# Patient Record
Sex: MALE | Race: WHITE | Employment: FULL TIME | URBAN - NONMETROPOLITAN AREA
[De-identification: names, ages, dates, MRNs, and addresses within clinical notes are randomized per-mention and may not be internally consistent; named-entity substitution may affect disease eponyms.]

---

## 2020-01-23 ENCOUNTER — HOSPITAL ENCOUNTER (INPATIENT)
Age: 65
LOS: 2 days | Discharge: HOME | DRG: 470 | End: 2020-01-25
Attending: ORTHOPAEDIC SURGERY | Admitting: ORTHOPAEDIC SURGERY
Payer: COMMERCIAL

## 2020-01-23 ENCOUNTER — APPOINTMENT (OUTPATIENT)
Dept: GENERAL RADIOLOGY | Age: 65
DRG: 470 | End: 2020-01-23
Payer: COMMERCIAL

## 2020-01-23 ENCOUNTER — ANESTHESIA EVENT (OUTPATIENT)
Dept: OPERATING ROOM | Age: 65
DRG: 470 | End: 2020-01-23
Payer: COMMERCIAL

## 2020-01-23 ENCOUNTER — APPOINTMENT (OUTPATIENT)
Dept: CT IMAGING | Age: 65
DRG: 470 | End: 2020-01-23
Payer: COMMERCIAL

## 2020-01-23 ENCOUNTER — ANESTHESIA (OUTPATIENT)
Dept: OPERATING ROOM | Age: 65
DRG: 470 | End: 2020-01-23
Payer: COMMERCIAL

## 2020-01-23 VITALS
RESPIRATION RATE: 10 BRPM | OXYGEN SATURATION: 100 % | TEMPERATURE: 99.3 F | DIASTOLIC BLOOD PRESSURE: 65 MMHG | SYSTOLIC BLOOD PRESSURE: 102 MMHG

## 2020-01-23 PROBLEM — D72.829 LEUKOCYTOSIS: Status: ACTIVE | Noted: 2020-01-23

## 2020-01-23 PROBLEM — S32.422A CLOSED POSTERIOR WALL ACETABULAR FX, LEFT, INITIAL ENCOUNTER (HCC): Status: ACTIVE | Noted: 2020-01-23
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
## 2020-01-23 PROBLEM — D64.9 ANEMIA: Status: ACTIVE | Noted: 2020-01-23

## 2020-01-23 LAB
ANION GAP SERPL CALCULATED.3IONS-SCNC: 12 MEQ/L (ref 8–16)
APTT: 30.6 SECONDS (ref 22–38)
BILIRUBIN URINE: NEGATIVE
BLOOD, URINE: NEGATIVE
BUN BLDV-MCNC: 24 MG/DL (ref 7–22)
CALCIUM SERPL-MCNC: 8.8 MG/DL (ref 8.5–10.5)
CHARACTER, URINE: CLEAR
CHLORIDE BLD-SCNC: 103 MEQ/L (ref 98–111)
CO2: 24 MEQ/L (ref 23–33)
COLOR: YELLOW
CREAT SERPL-MCNC: 0.7 MG/DL (ref 0.4–1.2)
EKG ATRIAL RATE: 76 BPM
EKG P AXIS: 56 DEGREES
EKG P-R INTERVAL: 138 MS
EKG Q-T INTERVAL: 372 MS
EKG QRS DURATION: 90 MS
EKG QTC CALCULATION (BAZETT): 418 MS
EKG R AXIS: -4 DEGREES
EKG T AXIS: 76 DEGREES
EKG VENTRICULAR RATE: 76 BPM
ERYTHROCYTE [DISTWIDTH] IN BLOOD BY AUTOMATED COUNT: 12.4 % (ref 11.5–14.5)
ERYTHROCYTE [DISTWIDTH] IN BLOOD BY AUTOMATED COUNT: 42.8 FL (ref 35–45)
GFR SERPL CREATININE-BSD FRML MDRD: > 90 ML/MIN/1.73M2
GLUCOSE BLD-MCNC: 149 MG/DL (ref 70–108)
GLUCOSE, URINE: NEGATIVE MG/DL
HCT VFR BLD CALC: 35.2 % (ref 42–52)
HCT VFR BLD CALC: 40.4 % (ref 42–52)
HEMOGLOBIN: 11.4 GM/DL (ref 14–18)
HEMOGLOBIN: 13.3 GM/DL (ref 14–18)
INR BLD: 1.16 (ref 0.85–1.13)
KETONES, URINE: 15
LEUKOCYTE EST, POC: NEGATIVE
MCH RBC QN AUTO: 31.6 PG (ref 26–33)
MCHC RBC AUTO-ENTMCNC: 32.9 GM/DL (ref 32.2–35.5)
MCV RBC AUTO: 96 FL (ref 80–94)
NITRITE, URINE: NEGATIVE
PH UA: 7 (ref 5–9)
PLATELET # BLD: 181 THOU/MM3 (ref 130–400)
PMV BLD AUTO: 10.4 FL (ref 9.4–12.4)
POTASSIUM SERPL-SCNC: 4.5 MEQ/L (ref 3.5–5.2)
PROTEIN UA: NEGATIVE MG/DL
RBC # BLD: 4.21 MILL/MM3 (ref 4.7–6.1)
SODIUM BLD-SCNC: 139 MEQ/L (ref 135–145)
SPECIFIC GRAVITY UA: > 1.03 (ref 1–1.03)
UROBILINOGEN, URINE: 0.2 EU/DL (ref 0–1)
WBC # BLD: 12.1 THOU/MM3 (ref 4.8–10.8)

## 2020-01-23 PROCEDURE — 72170 X-RAY EXAM OF PELVIS: CPT

## 2020-01-23 PROCEDURE — 3700000001 HC ADD 15 MINUTES (ANESTHESIA): Performed by: ORTHOPAEDIC SURGERY

## 2020-01-23 PROCEDURE — 99285 EMERGENCY DEPT VISIT HI MDM: CPT

## 2020-01-23 PROCEDURE — 1200000000 HC SEMI PRIVATE

## 2020-01-23 PROCEDURE — 6360000002 HC RX W HCPCS: Performed by: NURSE PRACTITIONER

## 2020-01-23 PROCEDURE — 2580000003 HC RX 258: Performed by: NURSE PRACTITIONER

## 2020-01-23 PROCEDURE — 6360000002 HC RX W HCPCS: Performed by: ANESTHESIOLOGY

## 2020-01-23 PROCEDURE — 71045 X-RAY EXAM CHEST 1 VIEW: CPT

## 2020-01-23 PROCEDURE — 6820000001 HC L2 TRAUMA SURGERY EVALUATION: Performed by: SURGERY

## 2020-01-23 PROCEDURE — APPSS30 APP SPLIT SHARED TIME 16-30 MINUTES: Performed by: PHYSICIAN ASSISTANT

## 2020-01-23 PROCEDURE — 7100000001 HC PACU RECOVERY - ADDTL 15 MIN: Performed by: ORTHOPAEDIC SURGERY

## 2020-01-23 PROCEDURE — 0SRB0JZ REPLACEMENT OF LEFT HIP JOINT WITH SYNTHETIC SUBSTITUTE, OPEN APPROACH: ICD-10-PCS | Performed by: ORTHOPAEDIC SURGERY

## 2020-01-23 PROCEDURE — 2580000003 HC RX 258: Performed by: ORTHOPAEDIC SURGERY

## 2020-01-23 PROCEDURE — 36415 COLL VENOUS BLD VENIPUNCTURE: CPT

## 2020-01-23 PROCEDURE — 85730 THROMBOPLASTIN TIME PARTIAL: CPT

## 2020-01-23 PROCEDURE — 2709999900 HC NON-CHARGEABLE SUPPLY: Performed by: ORTHOPAEDIC SURGERY

## 2020-01-23 PROCEDURE — 3700000000 HC ANESTHESIA ATTENDED CARE: Performed by: ORTHOPAEDIC SURGERY

## 2020-01-23 PROCEDURE — 2500000003 HC RX 250 WO HCPCS

## 2020-01-23 PROCEDURE — 99252 IP/OBS CONSLTJ NEW/EST SF 35: CPT | Performed by: INTERNAL MEDICINE

## 2020-01-23 PROCEDURE — 6370000000 HC RX 637 (ALT 250 FOR IP): Performed by: NURSE PRACTITIONER

## 2020-01-23 PROCEDURE — 3209999900 CT INTERPRETATION OF OUTSIDE IMAGES

## 2020-01-23 PROCEDURE — 85018 HEMOGLOBIN: CPT

## 2020-01-23 PROCEDURE — C1713 ANCHOR/SCREW BN/BN,TIS/BN: HCPCS | Performed by: ORTHOPAEDIC SURGERY

## 2020-01-23 PROCEDURE — 94760 N-INVAS EAR/PLS OXIMETRY 1: CPT

## 2020-01-23 PROCEDURE — 3600000005 HC SURGERY LEVEL 5 BASE: Performed by: ORTHOPAEDIC SURGERY

## 2020-01-23 PROCEDURE — 7100000000 HC PACU RECOVERY - FIRST 15 MIN: Performed by: ORTHOPAEDIC SURGERY

## 2020-01-23 PROCEDURE — C1776 JOINT DEVICE (IMPLANTABLE): HCPCS | Performed by: ORTHOPAEDIC SURGERY

## 2020-01-23 PROCEDURE — 6360000002 HC RX W HCPCS

## 2020-01-23 PROCEDURE — 85610 PROTHROMBIN TIME: CPT

## 2020-01-23 PROCEDURE — 85014 HEMATOCRIT: CPT

## 2020-01-23 PROCEDURE — 80048 BASIC METABOLIC PNL TOTAL CA: CPT

## 2020-01-23 PROCEDURE — 6360000002 HC RX W HCPCS: Performed by: ORTHOPAEDIC SURGERY

## 2020-01-23 PROCEDURE — 81003 URINALYSIS AUTO W/O SCOPE: CPT

## 2020-01-23 PROCEDURE — 93005 ELECTROCARDIOGRAM TRACING: CPT | Performed by: NURSE PRACTITIONER

## 2020-01-23 PROCEDURE — 85027 COMPLETE CBC AUTOMATED: CPT

## 2020-01-23 PROCEDURE — 73502 X-RAY EXAM HIP UNI 2-3 VIEWS: CPT

## 2020-01-23 PROCEDURE — 3600000015 HC SURGERY LEVEL 5 ADDTL 15MIN: Performed by: ORTHOPAEDIC SURGERY

## 2020-01-23 PROCEDURE — 96372 THER/PROPH/DIAG INJ SC/IM: CPT

## 2020-01-23 PROCEDURE — 2500000003 HC RX 250 WO HCPCS: Performed by: ORTHOPAEDIC SURGERY

## 2020-01-23 DEVICE — ANTHOLOGY HIGH OFFSET POROUS SIZE 10
Type: IMPLANTABLE DEVICE | Site: HIP | Status: FUNCTIONAL
Brand: ANTHOLOGY

## 2020-01-23 DEVICE — REDAPT LOCKING SCREW 45MM
Type: IMPLANTABLE DEVICE | Site: HIP | Status: FUNCTIONAL
Brand: REDAPT

## 2020-01-23 DEVICE — REDAPT LOCKING SCREW 15MM
Type: IMPLANTABLE DEVICE | Site: HIP | Status: FUNCTIONAL
Brand: REDAPT

## 2020-01-23 DEVICE — 44MM COCR MODULAR FEMORAL HEAD: Type: IMPLANTABLE DEVICE | Site: HIP | Status: FUNCTIONAL

## 2020-01-23 DEVICE — R3 0 DEGREE +4 ACETABULAR LINER                                    44MM INNER DIAMETER X 62MM OUTER DIAMETER
Type: IMPLANTABLE DEVICE | Site: HIP | Status: FUNCTIONAL
Brand: R3

## 2020-01-23 DEVICE — REDAPT LOCKING SCREW 20MM
Type: IMPLANTABLE DEVICE | Site: HIP | Status: FUNCTIONAL
Brand: REDAPT

## 2020-01-23 DEVICE — REDAPT LOCKING SCREW 25MM
Type: IMPLANTABLE DEVICE | Site: HIP | Status: FUNCTIONAL
Brand: REDAPT

## 2020-01-23 DEVICE — REDAPT MODULAR SHELL 62MM
Type: IMPLANTABLE DEVICE | Site: HIP | Status: FUNCTIONAL
Brand: REDAPT

## 2020-01-23 DEVICE — REFLECTION SPHERICAL HEAD SCREW 35MM
Type: IMPLANTABLE DEVICE | Site: HIP | Status: FUNCTIONAL
Brand: REFLECTION

## 2020-01-23 DEVICE — REDAPT LOCKING SCREW 30MM
Type: IMPLANTABLE DEVICE | Site: HIP | Status: FUNCTIONAL
Brand: REDAPT

## 2020-01-23 DEVICE — TITANIUM MODULAR HEAD SLEEVE 12/14                                    TAPER -4: Type: IMPLANTABLE DEVICE | Site: HIP | Status: FUNCTIONAL

## 2020-01-23 DEVICE — REFLECTION INTERFIT THREADED HOLE COVER
Type: IMPLANTABLE DEVICE | Site: HIP | Status: FUNCTIONAL
Brand: REFLECTION

## 2020-01-23 DEVICE — REDAPT LOCKING SCREW 35MM
Type: IMPLANTABLE DEVICE | Site: HIP | Status: FUNCTIONAL
Brand: REDAPT

## 2020-01-23 DEVICE — UNIVERSAL CANCELLOUS ACETABULAR                                    SCREW 30MM: Type: IMPLANTABLE DEVICE | Site: HIP | Status: FUNCTIONAL

## 2020-01-23 DEVICE — REDAPT LOCKING SCREW 50MM
Type: IMPLANTABLE DEVICE | Site: HIP | Status: FUNCTIONAL
Brand: REDAPT

## 2020-01-23 RX ORDER — MORPHINE SULFATE 2 MG/ML
2 INJECTION, SOLUTION INTRAMUSCULAR; INTRAVENOUS
Status: DISCONTINUED | OUTPATIENT
Start: 2020-01-23 | End: 2020-01-25 | Stop reason: HOSPADM

## 2020-01-23 RX ORDER — CYCLOBENZAPRINE HCL 10 MG
10 TABLET ORAL 3 TIMES DAILY PRN
Status: DISCONTINUED | OUTPATIENT
Start: 2020-01-23 | End: 2020-01-25 | Stop reason: HOSPADM

## 2020-01-23 RX ORDER — GLYCOPYRROLATE 1 MG/5 ML
SYRINGE (ML) INTRAVENOUS PRN
Status: DISCONTINUED | OUTPATIENT
Start: 2020-01-23 | End: 2020-01-23 | Stop reason: SDUPTHER

## 2020-01-23 RX ORDER — OXYCODONE HYDROCHLORIDE 5 MG/1
10 TABLET ORAL EVERY 4 HOURS PRN
Status: DISCONTINUED | OUTPATIENT
Start: 2020-01-23 | End: 2020-01-25 | Stop reason: HOSPADM

## 2020-01-23 RX ORDER — ONDANSETRON 2 MG/ML
4 INJECTION INTRAMUSCULAR; INTRAVENOUS EVERY 6 HOURS PRN
Status: DISCONTINUED | OUTPATIENT
Start: 2020-01-23 | End: 2020-01-25 | Stop reason: HOSPADM

## 2020-01-23 RX ORDER — TRANEXAMIC ACID 100 MG/ML
INJECTION, SOLUTION INTRAVENOUS PRN
Status: DISCONTINUED | OUTPATIENT
Start: 2020-01-23 | End: 2020-01-23 | Stop reason: ALTCHOICE

## 2020-01-23 RX ORDER — MORPHINE SULFATE 4 MG/ML
4 INJECTION, SOLUTION INTRAMUSCULAR; INTRAVENOUS
Status: DISCONTINUED | OUTPATIENT
Start: 2020-01-23 | End: 2020-01-25 | Stop reason: HOSPADM

## 2020-01-23 RX ORDER — PROMETHAZINE HYDROCHLORIDE 25 MG/ML
12.5 INJECTION, SOLUTION INTRAMUSCULAR; INTRAVENOUS
Status: DISCONTINUED | OUTPATIENT
Start: 2020-01-23 | End: 2020-01-23 | Stop reason: HOSPADM

## 2020-01-23 RX ORDER — DOCUSATE SODIUM 100 MG/1
100 CAPSULE, LIQUID FILLED ORAL DAILY
Status: DISCONTINUED | OUTPATIENT
Start: 2020-01-23 | End: 2020-01-25 | Stop reason: HOSPADM

## 2020-01-23 RX ORDER — LABETALOL 20 MG/4 ML (5 MG/ML) INTRAVENOUS SYRINGE
5 EVERY 10 MIN PRN
Status: DISCONTINUED | OUTPATIENT
Start: 2020-01-23 | End: 2020-01-23 | Stop reason: HOSPADM

## 2020-01-23 RX ORDER — VANCOMYCIN HYDROCHLORIDE 1 G/20ML
INJECTION, POWDER, LYOPHILIZED, FOR SOLUTION INTRAVENOUS PRN
Status: DISCONTINUED | OUTPATIENT
Start: 2020-01-23 | End: 2020-01-23 | Stop reason: ALTCHOICE

## 2020-01-23 RX ORDER — HYDROCODONE BITARTRATE AND ACETAMINOPHEN 5; 325 MG/1; MG/1
1-2 TABLET ORAL
Qty: 50 TABLET | Refills: 0 | Status: SHIPPED | OUTPATIENT
Start: 2020-01-23 | End: 2020-01-30

## 2020-01-23 RX ORDER — PROPOFOL 10 MG/ML
INJECTION, EMULSION INTRAVENOUS PRN
Status: DISCONTINUED | OUTPATIENT
Start: 2020-01-23 | End: 2020-01-23 | Stop reason: SDUPTHER

## 2020-01-23 RX ORDER — METOCLOPRAMIDE HYDROCHLORIDE 5 MG/ML
10 INJECTION INTRAMUSCULAR; INTRAVENOUS
Status: DISCONTINUED | OUTPATIENT
Start: 2020-01-23 | End: 2020-01-23 | Stop reason: HOSPADM

## 2020-01-23 RX ORDER — NEOSTIGMINE METHYLSULFATE 5 MG/5 ML
SYRINGE (ML) INTRAVENOUS PRN
Status: DISCONTINUED | OUTPATIENT
Start: 2020-01-23 | End: 2020-01-23 | Stop reason: SDUPTHER

## 2020-01-23 RX ORDER — DEXAMETHASONE SODIUM PHOSPHATE 4 MG/ML
INJECTION, SOLUTION INTRA-ARTICULAR; INTRALESIONAL; INTRAMUSCULAR; INTRAVENOUS; SOFT TISSUE PRN
Status: DISCONTINUED | OUTPATIENT
Start: 2020-01-23 | End: 2020-01-23 | Stop reason: SDUPTHER

## 2020-01-23 RX ORDER — DIPHENHYDRAMINE HYDROCHLORIDE 50 MG/ML
12.5 INJECTION INTRAMUSCULAR; INTRAVENOUS
Status: DISCONTINUED | OUTPATIENT
Start: 2020-01-23 | End: 2020-01-23 | Stop reason: HOSPADM

## 2020-01-23 RX ORDER — LIDOCAINE HYDROCHLORIDE 20 MG/ML
INJECTION, SOLUTION EPIDURAL; INFILTRATION; INTRACAUDAL; PERINEURAL PRN
Status: DISCONTINUED | OUTPATIENT
Start: 2020-01-23 | End: 2020-01-23 | Stop reason: SDUPTHER

## 2020-01-23 RX ORDER — SODIUM CHLORIDE 0.9 % (FLUSH) 0.9 %
10 SYRINGE (ML) INJECTION EVERY 12 HOURS SCHEDULED
Status: DISCONTINUED | OUTPATIENT
Start: 2020-01-23 | End: 2020-01-25 | Stop reason: HOSPADM

## 2020-01-23 RX ORDER — MEPERIDINE HYDROCHLORIDE 25 MG/ML
12.5 INJECTION INTRAMUSCULAR; INTRAVENOUS; SUBCUTANEOUS EVERY 5 MIN PRN
Status: DISCONTINUED | OUTPATIENT
Start: 2020-01-23 | End: 2020-01-23 | Stop reason: HOSPADM

## 2020-01-23 RX ORDER — FENTANYL CITRATE 50 UG/ML
25 INJECTION, SOLUTION INTRAMUSCULAR; INTRAVENOUS EVERY 5 MIN PRN
Status: DISCONTINUED | OUTPATIENT
Start: 2020-01-23 | End: 2020-01-23 | Stop reason: HOSPADM

## 2020-01-23 RX ORDER — FENTANYL CITRATE 50 UG/ML
INJECTION, SOLUTION INTRAMUSCULAR; INTRAVENOUS PRN
Status: DISCONTINUED | OUTPATIENT
Start: 2020-01-23 | End: 2020-01-23 | Stop reason: SDUPTHER

## 2020-01-23 RX ORDER — OXYCODONE HYDROCHLORIDE 5 MG/1
5 TABLET ORAL EVERY 4 HOURS PRN
Status: DISCONTINUED | OUTPATIENT
Start: 2020-01-23 | End: 2020-01-25 | Stop reason: HOSPADM

## 2020-01-23 RX ORDER — ROCURONIUM BROMIDE 10 MG/ML
INJECTION, SOLUTION INTRAVENOUS PRN
Status: DISCONTINUED | OUTPATIENT
Start: 2020-01-23 | End: 2020-01-23 | Stop reason: SDUPTHER

## 2020-01-23 RX ORDER — SUCCINYLCHOLINE CHLORIDE 20 MG/ML
INJECTION INTRAMUSCULAR; INTRAVENOUS PRN
Status: DISCONTINUED | OUTPATIENT
Start: 2020-01-23 | End: 2020-01-23 | Stop reason: SDUPTHER

## 2020-01-23 RX ORDER — SODIUM CHLORIDE 9 MG/ML
INJECTION, SOLUTION INTRAVENOUS CONTINUOUS
Status: DISCONTINUED | OUTPATIENT
Start: 2020-01-23 | End: 2020-01-25 | Stop reason: HOSPADM

## 2020-01-23 RX ORDER — SODIUM CHLORIDE 0.9 % (FLUSH) 0.9 %
10 SYRINGE (ML) INJECTION PRN
Status: DISCONTINUED | OUTPATIENT
Start: 2020-01-23 | End: 2020-01-25 | Stop reason: HOSPADM

## 2020-01-23 RX ORDER — FENTANYL CITRATE 50 UG/ML
50 INJECTION, SOLUTION INTRAMUSCULAR; INTRAVENOUS EVERY 5 MIN PRN
Status: DISCONTINUED | OUTPATIENT
Start: 2020-01-23 | End: 2020-01-23 | Stop reason: HOSPADM

## 2020-01-23 RX ORDER — ONDANSETRON 2 MG/ML
INJECTION INTRAMUSCULAR; INTRAVENOUS PRN
Status: DISCONTINUED | OUTPATIENT
Start: 2020-01-23 | End: 2020-01-23 | Stop reason: SDUPTHER

## 2020-01-23 RX ORDER — CEFAZOLIN SODIUM 1 G/3ML
INJECTION, POWDER, FOR SOLUTION INTRAMUSCULAR; INTRAVENOUS PRN
Status: DISCONTINUED | OUTPATIENT
Start: 2020-01-23 | End: 2020-01-23 | Stop reason: SDUPTHER

## 2020-01-23 RX ORDER — KETOROLAC TROMETHAMINE 30 MG/ML
15 INJECTION, SOLUTION INTRAMUSCULAR; INTRAVENOUS EVERY 6 HOURS
Status: DISCONTINUED | OUTPATIENT
Start: 2020-01-23 | End: 2020-01-24

## 2020-01-23 RX ORDER — ORPHENADRINE CITRATE 30 MG/ML
60 INJECTION INTRAMUSCULAR; INTRAVENOUS ONCE
Status: COMPLETED | OUTPATIENT
Start: 2020-01-23 | End: 2020-01-23

## 2020-01-23 RX ADMIN — SODIUM CHLORIDE: 9 INJECTION, SOLUTION INTRAVENOUS at 04:09

## 2020-01-23 RX ADMIN — FENTANYL CITRATE 50 MCG: 50 INJECTION, SOLUTION INTRAMUSCULAR; INTRAVENOUS at 10:27

## 2020-01-23 RX ADMIN — SODIUM CHLORIDE, PRESERVATIVE FREE 10 ML: 5 INJECTION INTRAVENOUS at 21:08

## 2020-01-23 RX ADMIN — ROCURONIUM BROMIDE 30 MG: 10 INJECTION INTRAVENOUS at 11:13

## 2020-01-23 RX ADMIN — KETOROLAC TROMETHAMINE 15 MG: 30 INJECTION, SOLUTION INTRAMUSCULAR at 21:07

## 2020-01-23 RX ADMIN — DEXTROSE MONOHYDRATE 2 G: 50 INJECTION, SOLUTION INTRAVENOUS at 17:59

## 2020-01-23 RX ADMIN — RIVAROXABAN 10 MG: 10 TABLET, FILM COATED ORAL at 21:08

## 2020-01-23 RX ADMIN — PHENYLEPHRINE HYDROCHLORIDE 100 MCG: 10 INJECTION INTRAVENOUS at 11:08

## 2020-01-23 RX ADMIN — PHENYLEPHRINE HYDROCHLORIDE 100 MCG: 10 INJECTION INTRAVENOUS at 11:27

## 2020-01-23 RX ADMIN — FENTANYL CITRATE 50 MCG: 50 INJECTION, SOLUTION INTRAMUSCULAR; INTRAVENOUS at 10:32

## 2020-01-23 RX ADMIN — SODIUM CHLORIDE: 9 INJECTION, SOLUTION INTRAVENOUS at 23:54

## 2020-01-23 RX ADMIN — DOCUSATE SODIUM 100 MG: 100 CAPSULE, LIQUID FILLED ORAL at 14:56

## 2020-01-23 RX ADMIN — ONDANSETRON HYDROCHLORIDE 4 MG: 4 INJECTION, SOLUTION INTRAMUSCULAR; INTRAVENOUS at 11:31

## 2020-01-23 RX ADMIN — Medication 1 MG: at 12:20

## 2020-01-23 RX ADMIN — MORPHINE SULFATE 2 MG: 2 INJECTION, SOLUTION INTRAMUSCULAR; INTRAVENOUS at 08:31

## 2020-01-23 RX ADMIN — PHENYLEPHRINE HYDROCHLORIDE 50 MCG: 10 INJECTION INTRAVENOUS at 11:03

## 2020-01-23 RX ADMIN — FENTANYL CITRATE 50 MCG: 50 INJECTION, SOLUTION INTRAMUSCULAR; INTRAVENOUS at 10:45

## 2020-01-23 RX ADMIN — ROCURONIUM BROMIDE 20 MG: 10 INJECTION INTRAVENOUS at 10:42

## 2020-01-23 RX ADMIN — KETOROLAC TROMETHAMINE 15 MG: 30 INJECTION, SOLUTION INTRAMUSCULAR at 12:20

## 2020-01-23 RX ADMIN — Medication 0.6 MG: at 11:37

## 2020-01-23 RX ADMIN — MORPHINE SULFATE 4 MG: 4 INJECTION, SOLUTION INTRAMUSCULAR; INTRAVENOUS at 04:15

## 2020-01-23 RX ADMIN — PHENYLEPHRINE HYDROCHLORIDE 100 MCG: 10 INJECTION INTRAVENOUS at 10:42

## 2020-01-23 RX ADMIN — MORPHINE SULFATE 2 MG: 2 INJECTION, SOLUTION INTRAMUSCULAR; INTRAVENOUS at 17:53

## 2020-01-23 RX ADMIN — FENTANYL CITRATE 50 MCG: 50 INJECTION, SOLUTION INTRAMUSCULAR; INTRAVENOUS at 12:20

## 2020-01-23 RX ADMIN — SUCCINYLCHOLINE CHLORIDE 120 MG: 20 INJECTION, SOLUTION INTRAMUSCULAR; INTRAVENOUS at 10:32

## 2020-01-23 RX ADMIN — PROPOFOL 200 MG: 10 INJECTION, EMULSION INTRAVENOUS at 10:32

## 2020-01-23 RX ADMIN — HYDROMORPHONE HYDROCHLORIDE 1 MG: 1 INJECTION, SOLUTION INTRAMUSCULAR; INTRAVENOUS; SUBCUTANEOUS at 12:20

## 2020-01-23 RX ADMIN — SODIUM CHLORIDE: 9 INJECTION, SOLUTION INTRAVENOUS at 10:50

## 2020-01-23 RX ADMIN — LIDOCAINE HYDROCHLORIDE 4 ML: 20 INJECTION, SOLUTION EPIDURAL; INFILTRATION; INTRACAUDAL; PERINEURAL at 10:32

## 2020-01-23 RX ADMIN — PHENYLEPHRINE HYDROCHLORIDE 50 MCG: 10 INJECTION INTRAVENOUS at 10:58

## 2020-01-23 RX ADMIN — ORPHENADRINE CITRATE 60 MG: 30 INJECTION INTRAMUSCULAR; INTRAVENOUS at 03:33

## 2020-01-23 RX ADMIN — Medication 3 MG: at 11:37

## 2020-01-23 RX ADMIN — TRANEXAMIC ACID 1000 MG: 100 INJECTION, SOLUTION INTRAVENOUS at 10:37

## 2020-01-23 RX ADMIN — FENTANYL CITRATE 50 MCG: 50 INJECTION, SOLUTION INTRAMUSCULAR; INTRAVENOUS at 11:13

## 2020-01-23 RX ADMIN — PHENYLEPHRINE HYDROCHLORIDE 100 MCG: 10 INJECTION INTRAVENOUS at 11:14

## 2020-01-23 RX ADMIN — DEXAMETHASONE SODIUM PHOSPHATE 4 MG: 4 INJECTION, SOLUTION INTRAMUSCULAR; INTRAVENOUS at 10:50

## 2020-01-23 RX ADMIN — CEFAZOLIN 2000 MG: 1 INJECTION, POWDER, FOR SOLUTION INTRAMUSCULAR; INTRAVENOUS; PARENTERAL at 10:38

## 2020-01-23 ASSESSMENT — ENCOUNTER SYMPTOMS
RHINORRHEA: 0
NAUSEA: 0
WHEEZING: 0
DIARRHEA: 0
COUGH: 0
EYE PAIN: 0
ABDOMINAL PAIN: 0
SHORTNESS OF BREATH: 0
EYE ITCHING: 0
COLOR CHANGE: 0
CHEST TIGHTNESS: 0
TROUBLE SWALLOWING: 0
VOMITING: 0
ANAL BLEEDING: 0
CONSTIPATION: 0
EYE DISCHARGE: 0
SORE THROAT: 0
BLOOD IN STOOL: 0

## 2020-01-23 ASSESSMENT — PAIN DESCRIPTION - ONSET
ONSET: ON-GOING

## 2020-01-23 ASSESSMENT — PAIN DESCRIPTION - DESCRIPTORS
DESCRIPTORS: ACHING;SORE
DESCRIPTORS: SHARP
DESCRIPTORS: SHARP;ACHING
DESCRIPTORS: ACHING
DESCRIPTORS: ACHING
DESCRIPTORS: ACHING;SHARP
DESCRIPTORS: ACHING;SORE

## 2020-01-23 ASSESSMENT — PULMONARY FUNCTION TESTS
PIF_VALUE: 19
PIF_VALUE: 19
PIF_VALUE: 10
PIF_VALUE: 0
PIF_VALUE: 18
PIF_VALUE: 18
PIF_VALUE: 0
PIF_VALUE: 3
PIF_VALUE: 16
PIF_VALUE: 20
PIF_VALUE: 19
PIF_VALUE: 16
PIF_VALUE: 19
PIF_VALUE: 5
PIF_VALUE: 20
PIF_VALUE: 18
PIF_VALUE: 16
PIF_VALUE: 16
PIF_VALUE: 17
PIF_VALUE: 16
PIF_VALUE: 21
PIF_VALUE: 11
PIF_VALUE: 4
PIF_VALUE: 0
PIF_VALUE: 17
PIF_VALUE: 3
PIF_VALUE: 1
PIF_VALUE: 19
PIF_VALUE: 14
PIF_VALUE: 18
PIF_VALUE: 16
PIF_VALUE: 12
PIF_VALUE: 19
PIF_VALUE: 18
PIF_VALUE: 19
PIF_VALUE: 22
PIF_VALUE: 17
PIF_VALUE: 16
PIF_VALUE: 3
PIF_VALUE: 18
PIF_VALUE: 0
PIF_VALUE: 18
PIF_VALUE: 19
PIF_VALUE: 4
PIF_VALUE: 2
PIF_VALUE: 20
PIF_VALUE: 16
PIF_VALUE: 19
PIF_VALUE: 20
PIF_VALUE: 3
PIF_VALUE: 16
PIF_VALUE: 18
PIF_VALUE: 16
PIF_VALUE: 11
PIF_VALUE: 19
PIF_VALUE: 8
PIF_VALUE: 6
PIF_VALUE: 15
PIF_VALUE: 20
PIF_VALUE: 20
PIF_VALUE: 15
PIF_VALUE: 16
PIF_VALUE: 0
PIF_VALUE: 19
PIF_VALUE: 3
PIF_VALUE: 14
PIF_VALUE: 18
PIF_VALUE: 18
PIF_VALUE: 19
PIF_VALUE: 9
PIF_VALUE: 18
PIF_VALUE: 18
PIF_VALUE: 2
PIF_VALUE: 14
PIF_VALUE: 18
PIF_VALUE: 19
PIF_VALUE: 11
PIF_VALUE: 8
PIF_VALUE: 12
PIF_VALUE: 11
PIF_VALUE: 0
PIF_VALUE: 19
PIF_VALUE: 14
PIF_VALUE: 15
PIF_VALUE: 10
PIF_VALUE: 18
PIF_VALUE: 18
PIF_VALUE: 19
PIF_VALUE: 19

## 2020-01-23 ASSESSMENT — PAIN SCALES - GENERAL
PAINLEVEL_OUTOF10: 5
PAINLEVEL_OUTOF10: 6
PAINLEVEL_OUTOF10: 6
PAINLEVEL_OUTOF10: 5
PAINLEVEL_OUTOF10: 6
PAINLEVEL_OUTOF10: 5
PAINLEVEL_OUTOF10: 4
PAINLEVEL_OUTOF10: 5
PAINLEVEL_OUTOF10: 7
PAINLEVEL_OUTOF10: 8
PAINLEVEL_OUTOF10: 5
PAINLEVEL_OUTOF10: 7

## 2020-01-23 ASSESSMENT — PAIN DESCRIPTION - LOCATION
LOCATION: HIP
LOCATION: LEG
LOCATION: HIP

## 2020-01-23 ASSESSMENT — PAIN DESCRIPTION - PAIN TYPE
TYPE: SURGICAL PAIN
TYPE: ACUTE PAIN
TYPE: ACUTE PAIN;SURGICAL PAIN
TYPE: SURGICAL PAIN
TYPE: ACUTE PAIN
TYPE: ACUTE PAIN;SURGICAL PAIN

## 2020-01-23 ASSESSMENT — PAIN DESCRIPTION - FREQUENCY
FREQUENCY: CONTINUOUS
FREQUENCY: CONTINUOUS
FREQUENCY: INTERMITTENT
FREQUENCY: CONTINUOUS

## 2020-01-23 ASSESSMENT — PAIN DESCRIPTION - PROGRESSION
CLINICAL_PROGRESSION: NOT CHANGED
CLINICAL_PROGRESSION: NOT CHANGED
CLINICAL_PROGRESSION: GRADUALLY IMPROVING
CLINICAL_PROGRESSION: GRADUALLY IMPROVING
CLINICAL_PROGRESSION: NOT CHANGED
CLINICAL_PROGRESSION: GRADUALLY IMPROVING
CLINICAL_PROGRESSION: NOT CHANGED
CLINICAL_PROGRESSION: GRADUALLY WORSENING
CLINICAL_PROGRESSION: GRADUALLY WORSENING
CLINICAL_PROGRESSION: NOT CHANGED

## 2020-01-23 ASSESSMENT — PAIN DESCRIPTION - ORIENTATION
ORIENTATION: LEFT

## 2020-01-23 ASSESSMENT — PAIN - FUNCTIONAL ASSESSMENT
PAIN_FUNCTIONAL_ASSESSMENT: PREVENTS OR INTERFERES SOME ACTIVE ACTIVITIES AND ADLS

## 2020-01-23 NOTE — PROGRESS NOTES
Patient is drowsy. Heart regular rate and rhythm. Lung sounds clear bilaterally. DP, PT, Radial pulses 2+ and equal bilaterally. Abdomen is round, soft, and non-tender. Bowel sounds are active times four. Surgical dressing on the left hip is clean, dry, and intact. Patient given Ice Chips and Jello. Patient family in the room. Bed alarm on. Call light and bedside table within reach.  Ricardo BAIRD/CORBIN

## 2020-01-23 NOTE — H&P
History and Physical        CHIEF COMPLAINT:  Left hip pain    HISTORY OF PRESENT ILLNESS:      The patient is a 62 y.o. male  who presents to [facility] ED as a transfer from [facility] for treatment of injuries sustained after falling 4 feet in his grain bin. He states he thought he was on the last step and went to step down but fell onto the left hip. He denies other injuries and denies health problems, numbness and tingling in the extremity. He notes that he is comfortable with the pain medications. CT from an outlying facility. He is NPO. comminuted displaced fracture of the left ilium extending to the acetabular roof, anterior column and posterior column. The fractures extend to the anterior and posterior aspects of the quadrilateral plate of the    acetabulum. There are displaced comminuted fractures of the left superior and inferior pubic rami. There is deformity of the left os pubis likely related to an old healed fracture. Past Medical History:    History reviewed. No pertinent past medical history. Past Surgical History:    History reviewed. No pertinent surgical history. Medications Prior to Admission:   Prior to Admission medications    Not on File    Scheduled Meds:   sodium chloride flush  10 mL Intravenous 2 times per day     Continuous Infusions:   sodium chloride 100 mL/hr at 01/23/20 0409     PRN Meds:.sodium chloride flush, oxyCODONE **OR** oxyCODONE, morphine **OR** morphine, ondansetron    Allergies:  Patient has no known allergies.     Social History:   Social History     Socioeconomic History    Marital status: Single     Spouse name: None    Number of children: None    Years of education: None    Highest education level: None   Occupational History    None   Social Needs    Financial resource strain: None    Food insecurity:     Worry: None     Inability: None    Transportation needs:     Medical: None     Non-medical: None   Tobacco Use    Smoking status: Never Smoker    Smokeless tobacco: Never Used   Substance and Sexual Activity    Alcohol use: Yes     Comment: occasional beer    Drug use: Never    Sexual activity: None   Lifestyle    Physical activity:     Days per week: None     Minutes per session: None    Stress: None   Relationships    Social connections:     Talks on phone: None     Gets together: None     Attends Jehov[employer] witness service: None     Active member of club or organization: None     Attends meetings of clubs or organizations: None     Relationship status: None    Intimate partner violence:     Fear of current or ex partner: None     Emotionally abused: None     Physically abused: None     Forced sexual activity: None   Other Topics Concern    None   Social History Narrative    None     Social History     Tobacco Use   Smoking Status Never Smoker   Smokeless Tobacco Never Used     Social History     Substance and Sexual Activity   Alcohol Use Yes    Comment: occasional beer     Social History     Substance and Sexual Activity   Drug Use Never       Family History:  History reviewed. No pertinent family history. REVIEW OF SYSTEMS:  Constitutional: Denies any fever, chills. Derm: Denies any rash or skin color change. Eyes: Denies blurred or decreased in vision. Ent: Denies any tinnitus or vertigo. Resp: Denies any cough or shortness of breath. CV: Denies any syncope, palpitations or chest pain. GI:  Denies any abdominal pain, nausea, vomiting, constipation or diarrhea. : Denies any hematuria, hesitancy, or dysuria. Heme/Lymph: Denies any bleeding. Musculoskeletal: Positive for left hip pain  Neuro: Denies any dizziness, paresthesia or weakness.     PHYSICAL EXAM:  Patient Vitals for the past 24 hrs:   BP Temp Temp src Pulse Resp SpO2 Height Weight   01/23/20 0349 (!) 127/91 97.7 °F (36.5 °C) Oral 74 16 93 % -- --   01/23/20 0307 (!) 142/70 -- -- 70 18 93 % -- --   01/23/20 0207 (!) 143/94 98 °F (36.7 °C) Oral 73 16 94 % 6' 1\" (1.854 m) afshan hepatis. Remainder of the liver is unremarkable. Gallbladder/Biliary tree: Unremarkable. No calcified gallstones. No extrahepatic biliary dilatation. Spleen: Unremarkable. No splenomegaly. Pancreas: Unremarkable. No mass or pancreatic ductal dilatation. No findings to suggest acute pancreatitis. Adrenal glands: There is a 2 x 1.4 cm left adrenal mass which may represent an adenoma. Right adrenal is unremarkable. Kidneys and ureters: There are 2 hypodense lesions in the mid left kidney including an exophytic 1.2 x 1 cm lesion and and anterolateral cortical lesion measuring 1.7 x 1 cm. These may represent cysts although a solid mass, especially the more superior exophytic more cannot be excluded. Recommend correlation with nonemergent renal ultrasound. No hydroureteronephrosis. No renal or ureteral calculi. No findings to suggest acute pyelonephritis. Stomach, small bowel, and colon: Stomach and duodenum are unremarkable. There is mild dilatation of fluid-filled small bowel loops which may represent an ileus. Small bowel and colon are otherwise unremarkable. No bowel wall thickening or bowel obstruction. Appendix: Unremarkable. No findings to suggest acute appendicitis. Omentum and mesentery: Unremarkable Aorta, vascular: No aortic aneurysm or dissection. No significant vascular abnormality. Reproductive: Unremarkable Bladder: Unremarkable. No wall thickening or obvious mass. No calcified stones. Intraperitoneal/retroperitoneal Space: There is extraperitoneal hemorrhage adjacent to the left acetabular fracture displacing the urinary bladder to the right. The hemorrhage surrounds the left external iliac artery, displacing it anteriorly. There is a  moderate size left obturator internus hematoma. No hyperdense components are seen to suggest active arterial hemorrhage. There is also posterior retroperitoneal hemorrhage posterior and inferior to the lower pole of the left kidney.  The left iliacus muscle is also enlarged consistent with edema or hematoma. There is no ascites, abscess, adenopathy, or mass. No pneumoperitoneum. Abdominal and pelvic body wall soft tissues: There are bilateral fat-containing inguinal hernias. There is enlargement of the left external oblique muscle just above the left iliac crest consistent with edema/hematoma with mild ecchymosis of the overlying subcutaneous fat. Musculoskeletal structures: There is a comminuted displaced fracture of the left ilium extending to the acetabular roof, anterior column and posterior column. The fractures extend to the anterior and posterior aspects of the quadrilateral plate of the acetabulum. There are displaced comminuted fractures of the left superior and inferior pubic rami. There is deformity of the left os pubis likely related to an old healed fracture. Comminuted displaced fracture of the left ilium and acetabulum and left superior and inferior pubic rami. Associated pelvic hemorrhage displacing the bladder medially and surrounding the left external iliac artery. No evidence of active arterial hemorrhage. Posterior retroperitoneal hemorrhage posteroinferior to the left kidney. Left posterior lateral abdominal wall musculature edema/hemorrhage with overlying fat ecchymosis. 2 indeterminate hypodense left renal lesions, recommend nonemergent renal ultrasound correlation. 2 x 1.4 cm left adrenal mass, possibly an adenoma. Recommend follow-up abdomen pelvis CT in 6-12 months. 1.8 x 1.4 cm enhancing right lobe liver lesion, suspicious for a hemangioma. Recommend correlation with multiphase liver CT or MRI without and with gadolinium. Additional nonemergent findings as detailed above. **This report has been created using voice recognition software. It may contain minor errors which are inherent in voice recognition technology. ** Final report electronically signed by Dr. Rafael Shepherd on 1/23/2020 3:41 AM    Xr Pelvis Inlet Outlet    Result Date:

## 2020-01-23 NOTE — BRIEF OP NOTE
Brief Postoperative Note  ______________________________________________________________    Patient: Bill Molina  YOB: 1955  MRN: [id number]  Date of Procedure: 1/23/2020    Pre-Op Diagnosis: LEFT transverse acetabular FRACTURE    Post-Op Diagnosis: Same       Procedure(s):  LEFT HIP TOTAL ARTHROPLASTY    Anesthesia: General    Surgeon(s):  Sahara Canales MD    Staff:  Scrub Person First: Faiza Whitt  Scrub Person Second: Sheryl Shearer  Physician Assistant: Mitch Penn PA-C; Rachel Awad PA-C     Estimated Blood Loss: 047 ml    Complications: None    Specimens:   * No specimens in log *    Implants:  Implant Name Type Inv.  Item Serial No.  Lot No. LRB No. Used   IMPL HIP COVER THR HOLE MED DEMAND Hip IMPL HIP COVER THR HOLE MED DEMAND  SMITH [id number] Left 1   IMPL HIP SHELL REDAPT MODULAR 62MM Hip IMPL HIP SHELL REDAPT MODULAR 62MM  LATIF AND NEPH [id number] Left 1   SCREW HIP [facility] S[place]TH [place] 6.5X35MM Screw/Plate/Nail/Bipin SCREW HIP [facility] SOUTH HEAD 6.5X35MM  SMITH [id number] Left 1   SCREW HIP CANC ACET UNIV 6.5X30MM Screw/Plate/Nail/Bipin SCREW HIP CANC ACET UNIV 6.5X30MM  LATIF [id number] Left 1   IMPL HIP SCREW REDAPT LK 50MM Hip IMPL HIP SCREW REDAPT LK 50MM  SMITH [id number] Left 1   IMPL HIP SCREW REDAPT LK 15MM Hip IMPL HIP SCREW REDAPT LK 15MM  LATIF [id number] Left 1   IMPL HIP SCREW REDAPT LK 25MM Hip IMPL HIP SCREW REDAPT LK 25MM  LATIF [id number] Left 1   SCREW REDAPT LK 35MM Screw/Plate/Nail/Bipin SCREW REDAPT LK 35MM  SMITH [id number] Left 1   IMPL HIP SCREW REDAPT LK 20MM Hip IMPL HIP SCREW REDAPT LK 20MM  LATIF [id number] Left 1   SCREW REDAPT LK 35MM Screw/Plate/Nail/Bipin SCREW REDAPT LK 35MM  SMITH [id number] Left 1   IMPL HIP ACET LINER O DEG PLUS 4 [id number] Hip IMPL HIP ACET LINER O DEG PLUS 4 [id number]  BHAVYA [id number] Left 1   IMPL HIP SCREW REDAPT LK 30MM Hip IMPL HIP SCREW REDAPT LK 30MM  SMITH [id number] Left 1   IMPL HIP SCREW REDAPT LK 45MM Hip IMPL HIP SCREW REDAPT LK 45MM  LIONEL [id number] Left 1   SCREW REDAPT LK 35MM Screw/Plate/Nail/Bipin SCREW REDAPT LK 35MM  SMITH [id number] Left 1   IMPL HIP FEM HEAD MOD COCR Hip IMPL HIP FEM HEAD MOD COCR  BHAVYA [id number] Left 1   IMPL HIP FEM STEM ANTHOLOGY HO SZ 10 Hip IMPL HIP FEM STEM ANTHOLOGY HO SZ 10  BHAVYA [id number] Left 1   IMPL HIP SLEEVE TO MOD HEAD 12/14 TPR -4MM Hip IMPL HIP SLEEVE TO MOD HEAD 12/14 TPR -4MM  BHAVYA [id number] Left 1         Drains: * No LDAs found *    Findings: confirmed     NO Mclaughlin - CNP  Date: 1/23/2020  Time: 11:34 AM

## 2020-01-23 NOTE — ANESTHESIA PRE PROCEDURE
Department of Anesthesiology  Preprocedure Note       Name:  Eloisa Bundy   Age:  62 y.o.  :  1955                                          MRN:  [id number]         Date:  2020      Surgeon: Ronit Alcantara):  Daniel Woods MD    Procedure: LEFT HIP TOTAL ARTHROPLASTY (Left Hip)    Medications prior to admission:   Prior to Admission medications    Not on File       Current medications:    Current Facility-Administered Medications   Medication Dose Route Frequency Provider Last Rate Last Dose    0.9 % sodium chloride infusion   Intravenous Continuous Charlotta [place], APRN -  mL/hr at 20 0409      sodium chloride flush 0.9 % injection 10 mL  10 mL Intravenous 2 times per day Charlotta [place], APRN - CNP        sodium chloride flush 0.9 % injection 10 mL  10 mL Intravenous PRN Charlotta [place], APRN - CNP        oxyCODONE (ROXICODONE) immediate release tablet 5 mg  5 mg Oral Q4H PRN Charlotta [place], APRN - CNP        Or    oxyCODONE (ROXICODONE) immediate release tablet 10 mg  10 mg Oral Q4H PRN Charlotta [place], APRN - CNP        morphine (PF) injection 2 mg  2 mg Intravenous Q2H PRN Charlotta [place], APRN - CNP   2 mg at 20 0831    Or    morphine injection 4 mg  4 mg Intravenous Q2H PRN Charlotta [place], APRN - CNP   4 mg at 20 0415    ondansetron (ZOFRAN) injection 4 mg  4 mg Intravenous Q6H PRN Charlotta [place], APRN - CNP           Allergies:  No Known Allergies    Problem List:    Patient Active Problem List   Diagnosis Code    Closed posterior wall acetabular fx, [facility] G95.858I       Past Medical History:  History reviewed. No pertinent past medical history. Past Surgical History:  History reviewed. No pertinent surgical history.     Social History:    Social History     Tobacco Use    Smoking status: Never Smoker    Smokeless tobacco: Never Used   Substance Use Topics    Alcohol use: Yes     Comment: occasional beer                                Counseling given: Not Answered      Vital Signs (Current):   Vitals:    01/23/20 0207 01/23/20 0307 01/23/20 0349 01/23/20 0745   BP: (!) 143/94 (!) 142/70 (!) 127/91 132/72   Pulse: 73 70 74 87   Resp: 16 18 16 16   Temp: 98 °F (36.7 °C)  97.7 °F (36.5 °C) 98.4 °F (36.9 °C)   TempSrc: Oral  Oral Oral   SpO2: 94% 93% 93% 91%   Weight: 220 lb (99.8 kg)      Height: 6' 1\" (1.854 m)                                                 BP Readings from Last 3 Encounters:   01/23/20 132/72       NPO Status:                                                                                 BMI:   Wt Readings from Last 3 Encounters:   01/23/20 220 lb (99.8 kg)     Body mass index is 29.03 kg/m². CBC:   Lab Results   Component Value Date    WBC 12.1 01/23/2020    RBC 4.21 01/23/2020    HGB 13.3 01/23/2020    HCT 40.4 01/23/2020    MCV 96.0 01/23/2020     01/23/2020       CMP:   Lab Results   Component Value Date     01/23/2020    K 4.5 01/23/2020     01/23/2020    CO2 24 01/23/2020    BUN 24 01/23/2020    CREATININE 0.7 01/23/2020    LABGLOM >90 01/23/2020    GLUCOSE 149 01/23/2020    CALCIUM 8.8 01/23/2020       POC Tests: No results for input(s): POCGLU, POCNA, POCK, POCCL, POCBUN, POCHEMO, POCHCT in the last 72 hours.     Coags:   Lab Results   Component Value Date    INR 1.16 01/23/2020    APTT 30.6 01/23/2020       HCG (If Applicable): No results found for: PREGTESTUR, PREGSERUM, HCG, HCGQUANT     ABGs: No results found for: PHART, PO2ART, QRB6IVT, ZQC0WNJ, BEART, I9VFFSQP     Type & Screen (If Applicable):  No results found for: LABABO, [address]    Anesthesia Evaluation  Patient summary reviewed no history of anesthetic complications:   Airway: Mallampati: II  TM distance: >3 FB   Neck ROM: full  Mouth opening: > = 3 FB Dental:          Pulmonary:Negative Pulmonary ROS and normal exam                               Cardiovascular:Negative CV ROS  Exercise tolerance: good (>4 METS),                     Neuro/Psych: Negative Neuro/Psych ROS              GI/Hepatic/Renal: Neg GI/Hepatic/Renal ROS            Endo/Other: Negative Endo/Other ROS                    Abdominal:           Vascular: negative vascular ROS. Anesthesia Plan      general     ASA 2       Induction: intravenous. MIPS: Postoperative opioids intended and Prophylactic antiemetics administered. Anesthetic plan and risks discussed with patient.       Plan discussed with CRNA and surgical team.                  Garland Sanchez MD   1/23/2020

## 2020-01-23 NOTE — ED TRIAGE NOTES
Pt comes in from [facility]. Earlier last night he was getting down from on top of a grin bin when he fell back on his left hip. The fall was 3-4ft. At [facility] they found he had a fracture in his left pelvis. He was given fentanyl and dilaudid for pain at [facility] and also rocephin for an elevated WBC. He is here for trauma consult. Pt is in mild pain 5/10. He was given an additional 50mcg of fentanyl in route.
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
## 2020-01-23 NOTE — CARE COORDINATION
1/23/20, 7:28 AM  DISCHARGE PLANNING EVALUATION:    Puneet Jay       Admitted from: [facility] 1/23/2020/ [facility] day: 0   Location: -21/021-A Reason for admit: Closed posterior wall acetabular fx, left, initial encounter [facility]) [S32.422A] Status: IP  Admit order signed?: no. Sticky note left for physician. PMH:  has no past medical history on file. Procedure: none  Pertinent abnormal Imaging:XR Pelvis   Impression:        Examination limited by the patient's body habitus.   Left ilium fracture extending to the acetabulum. Poorly visualized left superior and inferior pubic rami fractures. See the outside abdomen pelvis CT for greater detail         Medications:  Scheduled Meds:   sodium chloride flush  10 mL Intravenous 2 times per day     Continuous Infusions:   sodium chloride 100 mL/hr at 01/23/20 0409      Pertinent Info/Orders/Treatment Plan:  IV fluids, pain control, neurovascular checks, Hospitalist consult. Diet: Diet NPO Effective Now   Smoking status:  reports that he has never smoked. He has never used smokeless tobacco.   PCP: No primary care provider on file. Readmission 30 days or less: no  Readmission Risk Score: 8%    Discharge Planning Evaluation  Current Residence:  Independent Living  Living Arrangements:  Children   Support Systems:  Children  Current Services PTA:     Potential Assistance Needed:  N/A  Potential Assistance Purchasing Medications:  No  Does patient want to participate in local refill/ meds to beds program?  No  Type of Home Care Services:  None  Patient expects to be discharged to:  home  Expected Discharge date:  01/27/20  Follow Up Appointment: Best Day/ Time: Monday AM    Patient Goals/Plan/Treatment Preferences: Met with Sunil's daughter Oliver Gamino. Allison Swain lives at home independently. Irma lives with him. Plan is to return home at discharge. There may be some equipment needs at discharge, daughter was unsure of DME needs. Will follow.  Denies needs luis angel Huddleston at this time as well. Will continue to monitor. Transportation/Food Security/Housekeeping Addressed:  No issues identified.     Evaluation: no

## 2020-01-23 NOTE — PROGRESS NOTES
Patient returned to [phone] from PACU. Patient is drowsy, but alert and oriented x 4. Patient is currently on room air with sats in the mid 90s. 0.9 infusing in the left hand with 0 mls remaining. SCD present bilaterally. Family present at bedside. Call light within reach, and patient denies any needs at this time. See VS and Assessment for more information.

## 2020-01-23 NOTE — ED PROVIDER NOTES
Recommend correlation with multiphase liver CT or MRI without and with gadolinium. Additional nonemergent findings as detailed above. **This report has been created using voice recognition software. It may contain minor errors which are inherent in voice recognition technology. **      Final report electronically signed by Dr. John Ya on 1/23/2020 3:41 AM      XR CHEST STANDARD (2 VW)    (Results Pending)       I have seen this patient with [facility] and agree with his assessment and plan.      Selam Chau,   01/23/20 5261

## 2020-01-24 LAB
ANION GAP SERPL CALCULATED.3IONS-SCNC: 10 MEQ/L (ref 8–16)
BASOPHILS # BLD: 0.2 %
BASOPHILS ABSOLUTE: 0 THOU/MM3 (ref 0–0.1)
BUN BLDV-MCNC: 18 MG/DL (ref 7–22)
CALCIUM SERPL-MCNC: 8.1 MG/DL (ref 8.5–10.5)
CHLORIDE BLD-SCNC: 104 MEQ/L (ref 98–111)
CO2: 25 MEQ/L (ref 23–33)
CREAT SERPL-MCNC: 0.8 MG/DL (ref 0.4–1.2)
EOSINOPHIL # BLD: 0.1 %
EOSINOPHILS ABSOLUTE: 0 THOU/MM3 (ref 0–0.4)
ERYTHROCYTE [DISTWIDTH] IN BLOOD BY AUTOMATED COUNT: 12.7 % (ref 11.5–14.5)
ERYTHROCYTE [DISTWIDTH] IN BLOOD BY AUTOMATED COUNT: 46.1 FL (ref 35–45)
GFR SERPL CREATININE-BSD FRML MDRD: > 90 ML/MIN/1.73M2
GLUCOSE BLD-MCNC: 133 MG/DL (ref 70–108)
HCT VFR BLD CALC: 34 % (ref 42–52)
HEMOGLOBIN: 10.8 GM/DL (ref 14–18)
IMMATURE GRANS (ABS): 0.07 THOU/MM3 (ref 0–0.07)
IMMATURE GRANULOCYTES: 0.5 %
LYMPHOCYTES # BLD: 11.5 %
LYMPHOCYTES ABSOLUTE: 1.5 THOU/MM3 (ref 1–4.8)
MCH RBC QN AUTO: 31.6 PG (ref 26–33)
MCHC RBC AUTO-ENTMCNC: 31.8 GM/DL (ref 32.2–35.5)
MCV RBC AUTO: 99.4 FL (ref 80–94)
MONOCYTES # BLD: 13.6 %
MONOCYTES ABSOLUTE: 1.7 THOU/MM3 (ref 0.4–1.3)
NUCLEATED RED BLOOD CELLS: 0 /100 WBC
PLATELET # BLD: 154 THOU/MM3 (ref 130–400)
PMV BLD AUTO: 10.3 FL (ref 9.4–12.4)
POTASSIUM REFLEX MAGNESIUM: 4.6 MEQ/L (ref 3.5–5.2)
RBC # BLD: 3.42 MILL/MM3 (ref 4.7–6.1)
SEG NEUTROPHILS: 74.1 %
SEGMENTED NEUTROPHILS ABSOLUTE COUNT: 9.5 THOU/MM3 (ref 1.8–7.7)
SODIUM BLD-SCNC: 139 MEQ/L (ref 135–145)
WBC # BLD: 12.8 THOU/MM3 (ref 4.8–10.8)

## 2020-01-24 PROCEDURE — 1200000000 HC SEMI PRIVATE

## 2020-01-24 PROCEDURE — 97166 OT EVAL MOD COMPLEX 45 MIN: CPT

## 2020-01-24 PROCEDURE — 99231 SBSQ HOSP IP/OBS SF/LOW 25: CPT | Performed by: PHYSICIAN ASSISTANT

## 2020-01-24 PROCEDURE — 94760 N-INVAS EAR/PLS OXIMETRY 1: CPT

## 2020-01-24 PROCEDURE — 97110 THERAPEUTIC EXERCISES: CPT

## 2020-01-24 PROCEDURE — 6360000002 HC RX W HCPCS: Performed by: NURSE PRACTITIONER

## 2020-01-24 PROCEDURE — 97530 THERAPEUTIC ACTIVITIES: CPT

## 2020-01-24 PROCEDURE — 97535 SELF CARE MNGMENT TRAINING: CPT

## 2020-01-24 PROCEDURE — 80048 BASIC METABOLIC PNL TOTAL CA: CPT

## 2020-01-24 PROCEDURE — 85025 COMPLETE CBC W/AUTO DIFF WBC: CPT

## 2020-01-24 PROCEDURE — 2580000003 HC RX 258: Performed by: NURSE PRACTITIONER

## 2020-01-24 PROCEDURE — 97162 PT EVAL MOD COMPLEX 30 MIN: CPT

## 2020-01-24 PROCEDURE — 97116 GAIT TRAINING THERAPY: CPT

## 2020-01-24 PROCEDURE — 6370000000 HC RX 637 (ALT 250 FOR IP): Performed by: NURSE PRACTITIONER

## 2020-01-24 PROCEDURE — 36415 COLL VENOUS BLD VENIPUNCTURE: CPT

## 2020-01-24 RX ORDER — KETOROLAC TROMETHAMINE 30 MG/ML
15 INJECTION, SOLUTION INTRAMUSCULAR; INTRAVENOUS EVERY 6 HOURS
Status: DISCONTINUED | OUTPATIENT
Start: 2020-01-24 | End: 2020-01-25 | Stop reason: HOSPADM

## 2020-01-24 RX ADMIN — OXYCODONE HYDROCHLORIDE 10 MG: 5 TABLET ORAL at 15:04

## 2020-01-24 RX ADMIN — OXYCODONE HYDROCHLORIDE 5 MG: 5 TABLET ORAL at 03:58

## 2020-01-24 RX ADMIN — SODIUM CHLORIDE, PRESERVATIVE FREE 10 ML: 5 INJECTION INTRAVENOUS at 20:18

## 2020-01-24 RX ADMIN — DEXTROSE MONOHYDRATE 2 G: 50 INJECTION, SOLUTION INTRAVENOUS at 03:30

## 2020-01-24 RX ADMIN — OXYCODONE HYDROCHLORIDE 10 MG: 5 TABLET ORAL at 20:17

## 2020-01-24 RX ADMIN — DOCUSATE SODIUM 100 MG: 100 CAPSULE, LIQUID FILLED ORAL at 09:15

## 2020-01-24 RX ADMIN — KETOROLAC TROMETHAMINE 15 MG: 30 INJECTION, SOLUTION INTRAMUSCULAR at 07:18

## 2020-01-24 RX ADMIN — RIVAROXABAN 10 MG: 10 TABLET, FILM COATED ORAL at 20:17

## 2020-01-24 RX ADMIN — OXYCODONE HYDROCHLORIDE 10 MG: 5 TABLET ORAL at 09:15

## 2020-01-24 ASSESSMENT — PAIN DESCRIPTION - PAIN TYPE
TYPE: ACUTE PAIN;SURGICAL PAIN
TYPE: SURGICAL PAIN
TYPE: ACUTE PAIN;SURGICAL PAIN

## 2020-01-24 ASSESSMENT — PAIN - FUNCTIONAL ASSESSMENT
PAIN_FUNCTIONAL_ASSESSMENT: PREVENTS OR INTERFERES SOME ACTIVE ACTIVITIES AND ADLS

## 2020-01-24 ASSESSMENT — PAIN DESCRIPTION - ORIENTATION
ORIENTATION: LEFT

## 2020-01-24 ASSESSMENT — PAIN DESCRIPTION - PROGRESSION

## 2020-01-24 ASSESSMENT — PAIN DESCRIPTION - LOCATION
LOCATION: HIP

## 2020-01-24 ASSESSMENT — PAIN DESCRIPTION - DESCRIPTORS
DESCRIPTORS: ACHING

## 2020-01-24 ASSESSMENT — PAIN DESCRIPTION - ONSET
ONSET: ON-GOING

## 2020-01-24 ASSESSMENT — PAIN SCALES - GENERAL
PAINLEVEL_OUTOF10: 6
PAINLEVEL_OUTOF10: 7
PAINLEVEL_OUTOF10: 3
PAINLEVEL_OUTOF10: 4
PAINLEVEL_OUTOF10: 6
PAINLEVEL_OUTOF10: 6
PAINLEVEL_OUTOF10: 7
PAINLEVEL_OUTOF10: 4
PAINLEVEL_OUTOF10: 6
PAINLEVEL_OUTOF10: 4
PAINLEVEL_OUTOF10: 5
PAINLEVEL_OUTOF10: 4
PAINLEVEL_OUTOF10: 7

## 2020-01-24 ASSESSMENT — PAIN DESCRIPTION - FREQUENCY
FREQUENCY: INTERMITTENT

## 2020-01-24 NOTE — ANESTHESIA POSTPROCEDURE EVALUATION
Department of Anesthesiology  Postprocedure Note    Patient: Miriam Luevano  MRN: [id number]  YOB: 1955  Date of evaluation: 1/23/2020  Time:  8:30 PM     Procedure Summary     Date:  01/23/20 Room / Location:  [address] [facility]A [facility] [place]    Anesthesia Start:  8584 Anesthesia Stop:  0073    Procedure:  LEFT HIP TOTAL ARTHROPLASTY (Left Hip) Diagnosis:  (LEFT HIP FRACTURE)    Surgeon:  Matthew Lacy MD Responsible Provider:  Aysha Moran MD    Anesthesia Type:  general ASA Status:  2          Anesthesia Type: general    Theodore Phase I: Theodore Score: 9    Theodore Phase II:      Last vitals: Reviewed and per EMR flowsheets. Anesthesia Post Evaluation    Patient location during evaluation: PACU  Patient participation: complete - patient participated  Level of consciousness: awake and alert  Airway patency: patent  Nausea & Vomiting: no nausea and no vomiting  Complications: no  Cardiovascular status: hemodynamically stable  Respiratory status: acceptable  Hydration status: euvolemic    [facility]  POST-ANESTHESIA NOTE       Name:  Miriam Luevano                                         Age:  62 y.o. MRN:  [id number]      Last Vitals:  /75   Pulse 66   Temp 98.3 °F (36.8 °C) (Oral)   Resp 12   Ht 6' 1\" (1.854 m)   Wt 220 lb (99.8 kg)   SpO2 95%   BMI 29.03 kg/m²   No data found.     Level of Consciousness:  Awake    Respiratory:  Stable    Oxygen Saturation:  Stable    Cardiovascular:  Stable    Hydration:  Adequate    PONV:  Stable    Post-op Pain:  Adequate analgesia    Post-op Assessment:  No apparent anesthetic complications    Additional Follow-Up / Treatment / Comment:  None    Alessia Pool MD  January 23, 2020   8:30 PM

## 2020-01-24 NOTE — PROGRESS NOTES
Recent Labs     01/23/20  0744 01/24/20  0748    139   K 4.5 4.6    104   CO2 24 25   BUN 24* 18   CREATININE 0.7 0.8   CALCIUM 8.8 8.1*     Recent Labs     01/23/20  0502   INR 1.16*       Urinalysis:      Lab Results   Component Value Date    NITRU NEGATIVE 01/23/2020    BLOODU NEGATIVE 01/23/2020    SPECGRAV >1.030 01/23/2020       Radiology:  XR PELVIS (1-2 VIEWS)   Final Result      Long screws are seen within the left pelvis relating to hip replacement and previous fractures in these regions. **This report has been created using voice recognition software. It may contain minor errors which are inherent in voice recognition technology. **      Final report electronically signed by Dr. Frank Toussaint on 1/23/2020 4:08 PM      XR HIP LEFT (2-3 VIEWS)   Final Result      Left hip replacement has been placed. **This report has been created using voice recognition software. It may contain minor errors which are inherent in voice recognition technology. **      Final report electronically signed by Dr. Frank Toussaint on 1/23/2020 1:19 PM      XR CHEST PORTABLE   Final Result   1. Normal heart size. Thin fibrotic/atelectatic plaque in the lingula, and another in the right lateral costophrenic sulcus. 2. Otherwise normal chest. No acute infiltrates or effusions are seen. **This report has been created using voice recognition software. It may contain minor errors which are inherent in voice recognition technology. **      Final report electronically signed by Dr. Raúl Zepeda on 1/23/2020 8:13 AM      XR Pelvis [place] Out[place]   Final Result   Examination limited by the patient's body habitus. Left ilium fracture extending to the acetabulum. Poorly visualized left superior and inferior pubic rami fractures. See the outside abdomen pelvis CT for greater detail         **This report has been created using voice recognition software.  It may contain minor errors

## 2020-01-24 NOTE — PROGRESS NOTES
Wheelchair-manual, Crutches   Bathroom Shower/Tub: Tub/Shower unit  Bathroom Toilet: Standard  Bathroom Accessibility: Accessible       ADL Assistance: Independent  Homemaking Assistance: Independent  Homemaking Responsibilities: Yes  Ambulation Assistance: Independent  Transfer Assistance: Independent    Active : Yes  Occupation: Full time employment  Type of occupation: [employer] and works at [employer]        Cognition/Orientation:  Overall Orientation Status: Within Functional Limits  Overall Cognitive Status: Exceptions  Cognition Comment: Decreased safety. ADL's:  LE Dressing: Dependent/Total(To don B socks. )       Functional Mobility:  Bed mobility  Supine to Sit: Minimal assistance(HOB elevated and bedrail use. )  Scooting: Minimal assistance(To scoot hips to EOB. )    Functional Mobility  Functional - Mobility Device: Rolling Walker  Activity: Other  Assist Level: Minimal assistance  Functional Mobility Comments: Pt ambulated from EOB to recliner, slow pace, difficulty to advance LLE, step by step cueing, completed with WBAT LLE d/t order, OTR to assess mobility with TTWB. Balance:  Balance  Sitting Balance: Stand by assistance(Sitting EOB with 2 UE release )  Standing Balance: Contact guard assistance  Standing Balance  Time: ~1 minute  Activity: prep to ambulate     Transfers:  Sit to stand: Minimal assistance(From elevated EOB and min vcs for safety. )  Stand to sit: Minimal assistance(Onto recliner with min vcs for safety, )       Upper Extremity Assessment:   LUE AROM : WFL  RUE AROM : WFL    LUE Strength  Gross LUE Strength: WFL  L Hand General: 4+/5  RUE Strength  Gross RUE Strength: WFL  R Hand General: 4+/5    Sensation  Overall Sensation Status: WFL  RUE Tone: Normotonic  LUE Tone: Normotonic       Activity Tolerance: Patient limited by fatigue, Patient limited by pain     Assessment:  Assessment: This 62year old male is s/p fall resulting in Left LOW.  Pt requires skilled OT intervention to increase indep and safety with all self cares and IADLs to return to Indep PLOF. Performance deficits / Impairments: Decreased functional mobility , Decreased ADL status, Decreased strength, Decreased endurance, Decreased safe awareness, Decreased high-level IADLs, Decreased balance  Prognosis: Good  REQUIRES OT FOLLOW UP: Yes  Safety Devices in place: Yes  Type of devices: All fall risk precautions in place, Call light within reach, Patient at risk for falls, Left in chair, Chair alarm in place, Nurse notified, Gait belt    Treatment Initiated: Treatment and education initiated within context of evaluation. Evaluation time included review of current medical information, gathering information related to past medical, social and functional history, completion of standardized testing, formal and informal observation of tasks, assessment of data and development of plan of care and goals. Treatment time included skilled education and facilitation of tasks to increase safety and independence with ADL's for improved functional independence and quality of life.     Discharge Recommendations:  Continue to assess pending progress, Patient would benefit from continued therapy after discharge    Patient Education:  OT Education: OT Role, Precautions, Transfer Training, Plan of Care    Equipment Recommendations:  Equipment Needed: Yes  Mobility Devices: ADL Assistive Devices  ADL Assistive Devices: Reacher, Sock-Aid Hard, Long-handled [employer], Long-handled Sponge, Transfer Tub Bench, Toileting - 3-in-1 Commode    Plan:  Times per week: 6x  Current Treatment Recommendations: Strengthening, Endurance Training, Patient/Caregiver Education & Training, Equipment Evaluation, Education, & procurement, Balance Training, Functional Mobility Training, Safety Education & Training, Home Management Training, Self-Care / ADL    Goals:  Patient goals : Go Home with daughter   Short term goals  Time Frame for Short term goals: Until discharge   Short term goal 1: Pt will complete BUE strengthening exercises with min vcs for technique to increase indep and safety with all self cares. Short term goal 2: Pt will complete standing tolerance x 4 minutes with 1-2 UE release to increase indep and safety with grooming. Short term goal 3: Pt will complete assessment with OTR for functional mobility with TTWB to increase indep and safety in home environment. Short term goal 4: Pt will complete LB dressing with LHAE and Min A to increase indep and safety within home environment. Long term goals  Time Frame for Long term goals : No LTGs d/t short estimated length of stay. Following session, patient left in safe position with all fall risk precautions in place.

## 2020-01-24 NOTE — PROGRESS NOTES
placement of L LE, pt is compliant with TTWB. Pt leaning down onto crutches, cuing for improved technique. Min instability but no LOB     Exercise:  Patient was guided in 1 set(s) 10 reps of exercise to both lower extremities. Glut sets, Long arc quads, Seated hip flexion and Seated heel/toe raises. Exercises were completed for increased independence with functional mobility. Functional Outcome Measures: Completed  AM-PAC Inpatient Mobility without Stair Climbing Raw Score : 15  AM-PAC Inpatient without Stair Climbing T-Scale Score : 43.03    ASSESSMENT:  Assessment: Patient progressing toward established goals. and Pt tolerated session fairly well. Limited by TTWBing L LE, decreased mobility, decreased balance. Pt would benefit from continued therapy before returing home. Activity Tolerance:  Patient tolerance of  treatment: good. Equipment Recommendations: Other: will cont to assess, pt may need 2WW pending progress  Discharge Recommendations:  Continue to assess pending progress(TCU vs IP Rehab)    Plan: Times per week: 6xBID, 1xqd  Current Treatment Recommendations: Strengthening, Endurance Training, Transfer Training, Neuromuscular Re-education, ROM, Patient/Caregiver Education & Training, Equipment Evaluation, Education, & procurement, Balance Training, Gait Training, Home Exercise Program, Functional Mobility Training, Stair training, Safety Education & Training    Patient Education  Patient Education: Plan of Care, [employer] Mobility, Transfers, Gait    Goals:  Patient goals : return home  Short term goals  Time Frame for Short term goals: by discharge  Short term goal 1: bed mobility with mod I for ease of getting in/out of bed  Short term goal 2: sit <> stand with supervision A for ease of transfers   Short term goal 3: ambulate > 150ft with use of LRAD and stand-by assist to prepare for home d/c  Short term goal 4: negotiate 2 steps with use of LRAD and stand-by assist for safe entry/exit of his

## 2020-01-24 NOTE — PROGRESS NOTES
hip.  Neuro: Denies any dizziness, paresthesia or weakness. OBJECTIVE    Patient Vitals for the past 24 hrs:   BP Temp Temp src Pulse Resp SpO2   01/24/20 1133 (!) 125/59 -- -- 76 18 96 %   01/24/20 0950 -- -- -- -- -- 93 %   01/24/20 0912 (!) 141/72 98.4 °F (36.9 °C) Oral 89 18 95 %   01/24/20 0342 135/83 98 °F (36.7 °C) Oral 83 16 93 %   01/24/20 0000 131/62 98 °F (36.7 °C) Oral 74 16 93 %   01/23/20 2212 -- -- -- -- -- 90 %   01/23/20 2058 112/61 98.6 °F (37 °C) Oral 76 16 92 %     INTAKE/OUTPUT:    Intake/Output Summary (Last 24 hours) at 1/24/2020 1447  Last data filed at 1/24/2020 1428  Gross per 24 hour   Intake 1851.08 ml   Output 1100 ml   Net 751.08 ml     I/O last 3 completed shifts: In: 3308.9 [P.O.:700; I.V.:2608.9]  Out: 1100 [Urine:800; Blood:300]    PHYSICAL EXAM:  General appearance:  Alert and oriented x 3. No apparent distress, appears stated age and cooperative. HEENT:  Normal cephalic, atraumatic without obvious deformity. Conjunctivae/corneas clear. Neck: Supple, with full range of motion. Trachea midline. Respiratory:  Normal respiratory effort. No audible Wheezes and Rhonchi. Cardiovascular:  Regular rate and rhythm. Abdomen: Soft, non-tender, non-distended. Musculoskeletal: LLE:  Denies calf pain to palpation. decreased range of motion without deformity L hip. Pt can flex and extend left toes. Left hip incision dry and intact. No redness or drainage noted from incision site. Skin: Skin color normal.  No rashes or lesions. Neurologic:  Neurovascularly intact without any focal sensory/motor deficits. Sensation intact.    Capillary Refill: Brisk,< 3 seconds   Peripheral Pulses: +2 palpable, equal bilaterally    Data  CBC:   Lab Results   Component Value Date    WBC 12.8 01/24/2020    HGB 10.8 01/24/2020     01/24/2020     BMP:    Lab Results   Component Value Date     01/24/2020    K 4.6 01/24/2020     01/24/2020    CO2 25 01/24/2020    BUN 18 01/24/2020 CREATININE 0.8 01/24/2020    CALCIUM 8.1 01/24/2020    GLUCOSE 133 01/24/2020     Uric Acid:  No components found for: URIC  PT/INR:    Lab Results   Component Value Date    INR 1.16 01/23/2020     Troponin:  No results found for: TROPONINI  Urine Culture:  No components found for: SAMEERINE    ASSESSMENT:  Active Hospital Problems    Diagnosis Date Noted    Cl[facility]abular fx, [facility] Ina Seawhitley 01/23/2020    Leukocytosis [D72.829] 01/23/2020    Anemia [D64.9] 01/23/2020    Fall [V31. XXXA] 01/23/2020       PLAN  1. Dry drsg changes as needed   2. TTWB LLE  3. PT/OT to eval and treat   4.  Continue current medical management       Electronically signed by NO Pinon Asp, CNP on 1/24/2020 at 2:46 PM

## 2020-01-24 NOTE — OP NOTE
the acetabulum. Actually, by putting Hohmann's around  it, reduced it fairly well. We reamed up sequentially to size 62,  implanted a size 62 REDAPT cup. We got one nonlocking screw proximally  and distally, compressed the bone to the cup. We then put a series of  locking screws throughout the construct and swapped that nonlocking for  locking proximally. We then placed a +4 mm liner in. We then worked on  and we then addressed the femur. Additional screw fixation was utilized  to reduce the acetabular fracture. We used the  followed by canal finder lateralizing a bit,  broached up to size #10, implant size 10 stem, high offset. Trialed  head and neck lengths  with a -4 neck length, stable without range of  motion. Limb lengths appeared to be symmetric. We placed a cobalt  chrome 44 mm head. Wound was thoroughly irrigated. Repaired the  abductor and capsule back  with #5 Ethibond through bone tunnels. Iliotibial band was repaired with #2 Windell Maiers and injected with local  anesthetic as well as 1 gm tranexamic  acid. We placed 1 gm of  vancomycin in here as well. We repaired the iliotibial band with #2  Quill, skin was repaired with 0 Emilie More. The patient was then  awakened and returned to  recovery room in a good condition. POSTOPERATIVE PLAN:  He can be toe-touch weightbearing for the time  being. We will get some postop x-rays and make a final determination on  his weightbearing status. [facility] Ritesh, PAC; and Mitch Penn, PAC, assisted throughout  the procedure with positioning, draping, retraction, wound closure,  dressing, and splint application.     Donna Deluca M.D.    D: 01/23/2020 11:38:49       T: 01/23/2020 14:43:48     [email or web address]  Job#: [id number]     Doc#: [id number]    CC:

## 2020-01-24 NOTE — PROGRESS NOTES
Recommendations: Continue to assess pending progress(TCU vs IP Rehab)    Patient Education:  PT Education: Goals, PT Role, Plan of Care, Home Exercise Program, Transfer Training, Gait Training    Equipment Recommendations: Other: will cont to assess, pt may need 2WW pending progress    Plan:  Times per week: 6xBID, 1xqd  Current Treatment Recommendations: Strengthening, Endurance Training, Transfer Training, Neuromuscular Re-education, ROM, Patient/Caregiver Education & Training, Equipment Evaluation, Education, & procurement, Balance Training, Gait Training, Home Exercise Program, Functional Mobility Training, Stair training, Safety Education & Training    Goals:  Patient goals : return home  Short term goals  Time Frame for Short term goals: by discharge  Short term goal 1: bed mobility with mod I for ease of getting in/out of bed  Short term goal 2: sit <> stand with supervision A for ease of transfers   Short term goal 3: ambulate > 150ft with use of LRAD and stand-by assist to prepare for home d/c  Short term goal 4: negotiate 2 steps with use of LRAD and stand-by assist for safe entry/exit of his home  Long term goals  Time Frame for Long term goals : N/A secondary to short ELOS    Following session, patient left in safe position with all fall risk precautions in place.

## 2020-01-25 VITALS
WEIGHT: 220 LBS | HEIGHT: 73 IN | SYSTOLIC BLOOD PRESSURE: 121 MMHG | DIASTOLIC BLOOD PRESSURE: 61 MMHG | TEMPERATURE: 98.4 F | RESPIRATION RATE: 16 BRPM | OXYGEN SATURATION: 95 % | HEART RATE: 75 BPM | BODY MASS INDEX: 29.16 KG/M2

## 2020-01-25 PROCEDURE — 2580000003 HC RX 258: Performed by: NURSE PRACTITIONER

## 2020-01-25 PROCEDURE — 97110 THERAPEUTIC EXERCISES: CPT

## 2020-01-25 PROCEDURE — 6370000000 HC RX 637 (ALT 250 FOR IP): Performed by: NURSE PRACTITIONER

## 2020-01-25 PROCEDURE — 97535 SELF CARE MNGMENT TRAINING: CPT

## 2020-01-25 PROCEDURE — 6360000002 HC RX W HCPCS: Performed by: NURSE PRACTITIONER

## 2020-01-25 PROCEDURE — 97116 GAIT TRAINING THERAPY: CPT

## 2020-01-25 PROCEDURE — 97530 THERAPEUTIC ACTIVITIES: CPT

## 2020-01-25 RX ADMIN — OXYCODONE HYDROCHLORIDE 5 MG: 5 TABLET ORAL at 05:23

## 2020-01-25 RX ADMIN — OXYCODONE HYDROCHLORIDE 10 MG: 5 TABLET ORAL at 09:12

## 2020-01-25 RX ADMIN — KETOROLAC TROMETHAMINE 15 MG: 30 INJECTION, SOLUTION INTRAMUSCULAR at 12:04

## 2020-01-25 RX ADMIN — KETOROLAC TROMETHAMINE 15 MG: 30 INJECTION, SOLUTION INTRAMUSCULAR at 07:29

## 2020-01-25 RX ADMIN — SODIUM CHLORIDE, PRESERVATIVE FREE 10 ML: 5 INJECTION INTRAVENOUS at 09:14

## 2020-01-25 RX ADMIN — DOCUSATE SODIUM 100 MG: 100 CAPSULE, LIQUID FILLED ORAL at 09:12

## 2020-01-25 RX ADMIN — CYCLOBENZAPRINE 10 MG: 10 TABLET, FILM COATED ORAL at 09:12

## 2020-01-25 ASSESSMENT — PAIN DESCRIPTION - DESCRIPTORS
DESCRIPTORS: ACHING

## 2020-01-25 ASSESSMENT — PAIN - FUNCTIONAL ASSESSMENT
PAIN_FUNCTIONAL_ASSESSMENT: PREVENTS OR INTERFERES SOME ACTIVE ACTIVITIES AND ADLS

## 2020-01-25 ASSESSMENT — PAIN DESCRIPTION - LOCATION
LOCATION: HIP

## 2020-01-25 ASSESSMENT — PAIN DESCRIPTION - PROGRESSION
CLINICAL_PROGRESSION: NOT CHANGED

## 2020-01-25 ASSESSMENT — PAIN DESCRIPTION - FREQUENCY
FREQUENCY: INTERMITTENT

## 2020-01-25 ASSESSMENT — PAIN DESCRIPTION - PAIN TYPE
TYPE: ACUTE PAIN
TYPE: ACUTE PAIN;SURGICAL PAIN
TYPE: SURGICAL PAIN

## 2020-01-25 ASSESSMENT — PAIN DESCRIPTION - ONSET
ONSET: ON-GOING

## 2020-01-25 ASSESSMENT — PAIN SCALES - GENERAL
PAINLEVEL_OUTOF10: 6
PAINLEVEL_OUTOF10: 5
PAINLEVEL_OUTOF10: 7
PAINLEVEL_OUTOF10: 5
PAINLEVEL_OUTOF10: 4
PAINLEVEL_OUTOF10: 5
PAINLEVEL_OUTOF10: 6

## 2020-01-25 ASSESSMENT — PAIN DESCRIPTION - ORIENTATION
ORIENTATION: LEFT

## 2020-01-25 NOTE — PROGRESS NOTES
[address] [place] [address]  Occupational Therapy  Daily Note  Time:   Time In: 1334  Time Out: 1420  Timed Code Treatment Minutes: 55 Minutes  Minutes: 46          Date: 2020  Patient Name: Bjorn Avina,   Gender: male      Room: [facility]/021-A  MRN: [id number]  : 1955  (62 y.o.)  Referring Practitioner: NO Esteban CNP  Diagnosis: Closed Posterior Wall Acetabular fx left   Additional Pertinent Hx: Per H&P,\" The patient is a 62 y.o. male  who presents to [facility] ED as a transfer from [address] [place] [address] for treatment of injuries sustained after falling 4 feet in his grain bin. He states he thought he was on the last step and went to step down but fell onto the left hip. He denies other injuries and denies health problems, numbness and tingling in the extremity. He notes that he is comfortable with the pain medications. \" Pt is s/p Left LOW by Dr. Octaviano Bradley. Restrictions/Precautions:  Restrictions/Precautions: Weight Bearing, General Precautions, Fall Risk  Left Lower Extremity Weight Bearing: Weight Bearing As Tolerated( per MD)  Position Activity Restriction  Hip Precautions: No hip flexion > 90 degrees, No ADduction, No hip internal rotation    SUBJECTIVE: Nurse Kesha gutierrez session. In bed upon arrival, agreeable to OT session, daughter present    PAIN: 2/10: L hip    COGNITION: Slow Processing, Impaired Memory and Decreased Problem Solving    ADL:   Upper Extremity Dressing: with set-up.  donned shirt sitting at EOB  Lower Extremity Dressing: Minimal Assistance. donned underwear, pants, socks and shoes, used reacher and sock aid  patient able to recall 1/3 hip precautions. Educated and demonstrated for patient. Educated on purchasing options for tub transfer bench, LHAE and RTS, patient and daughter voiced understanding. BALANCE:  Standing Balance: Contact Guard Assistance.  approx 1 minute with 1 UE support       BED MOBILITY:  Supine to Sit: Stand By Assistance

## 2020-01-25 NOTE — PLAN OF CARE
Care plan reviewed with Patient, Patient verbalized understanding of plan of care and contributes to goal setting.

## 2020-01-25 NOTE — PROGRESS NOTES
Patient discharged at 1500. Patient educated on follow up appointments, medications, and surgical scrub. Patient sent home with a script for Xarelto and Standard Randall, as well as a referral for outpatient therapy. Patient discharged with scripts, discharged packet, and belongings at side. Patient states no further questions/concerns at this time. Family escorted patient down to lobby.

## 2020-01-25 NOTE — PROGRESS NOTES
Level Tile  Device:Rolling Walker  Gait Deviations: Forward Flexed Posture, Slow Candi, Decreased Step Length Bilaterally, Decreased Weight Shift Left, Decreased Arm Swing, Decreased Trunk Rotation, Decreased Gait Speed, Decreased Heel Strike Bilaterally and Narrow Base of Support      Exercise:  None at this time    Functional Outcome Measures: Completed  AM-PAC Inpatient Mobility without Stair Climbing Raw Score : 15  AM-PAC Inpatient without Stair Climbing T-Scale Score : 43.03    ASSESSMENT:  Assessment: Patient progressing toward established goals. Activity Tolerance:  Patient tolerance of  treatment: good. Equipment Recommendations: Other: will cont to assess, pt may need 2WW pending progress  Discharge Recommendations:    Continue to assess pending progress(TCU vs IP Rehab)    Plan: Times per week: 6xBID, 1xqd  Current Treatment Recommendations: Strengthening, Endurance Training, Transfer Training, Neuromuscular Re-education, ROM, Patient/Caregiver Education & Training, Equipment Evaluation, Education, & procurement, Balance Training, Gait Training, Home Exercise Program, Functional Mobility Training, Stair training, Safety Education & Training    Patient Education  Patient Education: Plan of Care, Car Transfers    Goals:  Patient goals : return home  Short term goals  Time Frame for Short term goals: by discharge  Short term goal 1: bed mobility with mod I for ease of getting in/out of bed  Short term goal 2: sit <> stand with supervision A for ease of transfers   Short term goal 3: ambulate > 150ft with use of LRAD and stand-by assist to prepare for home d/c  Short term goal 4: negotiate 2 steps with use of LRAD and stand-by assist for safe entry/exit of his home  Long term goals  Time Frame for Long term goals : N/A secondary to short ELOS    Following session, patient left in safe position with all fall risk precautions in place.

## 2020-01-25 NOTE — PLAN OF CARE
Problem: Pain:  Goal: Pain level will decrease  Description  Pain level will decrease  1/25/2020 1055 by Jc Goldsmith RN  Outcome: Ongoing  Note:   Patient states his pain goal is \"2/10. \" Patient states his pain level is a \"6/10. \" Patient states medication, rest, and repositioning help to control his pain. Problem: Falls - Risk of:  Goal: Will remain free from falls  Description  Will remain free from falls  1/25/2020 1055 by Jc Goldsmith RN  Outcome: Ongoing  Note:   Pt using call light appropriately to call for assistance with ambulation to the bathroom and to chair. Pt is also compliant with use of non-skid slippers. Pt reports understanding of fall prevention when discussed. Up with 1 assist, walker, and gait belt, and with steady gait. Tolerates working with PT and OT well. Problem: Risk for Impaired Skin Integrity  Goal: Tissue integrity - skin and mucous membranes  Description  Structural intactness and normal physiological function of skin and  mucous membranes. 1/25/2020 1055 by Jc Goldsmith RN  Outcome: Ongoing  Note:   Patient has a left hip incision d/t a left total hip surgery. Dressing dry and intact. Unable to visualize surgical incision due to surgical dressing. Dressing not to be removed today. No fevers, tachycardia, hypotension noted. Pt denies any complaints at this time. Problem: Discharge Planning:  Goal: Knowledge of discharge instructions  Description  Knowledge of discharge instructions  1/25/2020 1055 by Jc Goldsmith RN  Outcome: Ongoing  Note:   Patient plans home with wife at discharge. Patient working with social work and case management on discharge planning. Problem: Infection - Surgical Site:  Goal: Signs of wound healing will improve  Description  Signs of wound healing will improve  1/25/2020 1055 by Jc Goldsmith RN  Outcome: Ongoing  Note:   Patient has a left hip incision d/t a left total hip surgery. Dressing dry and intact.  Unable to
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
## (undated) DEVICE — SYSTEM SKIN CLSR 22CM DERMBND PRINEO

## (undated) DEVICE — BASIC SINGLE BASIN BTC-LF: Brand: MEDLINE INDUSTRIES, INC.

## (undated) DEVICE — E-Z CLEAN, NON-STICK, PTFE COATED, ELECTROSURGICAL BLADE ELECTRODE, 6.5 INCH (16.5 CM): Brand: MEGADYNE

## (undated) DEVICE — DUAL CUT SAGITTAL BLADE

## (undated) DEVICE — ROYAL SILK SURGICAL GOWN, XXL: Brand: CONVERTORS

## (undated) DEVICE — GOWN,SIRUS,NON REINFRCD,LARGE,SET IN SL: Brand: MEDLINE

## (undated) DEVICE — REFLECTION FLEXIBLE DRILL 35MM: Brand: REFLECTION

## (undated) DEVICE — SOLUTION IV 1000ML 0.9% SOD CHL PH 5 INJ USP VIAFLX PLAS

## (undated) DEVICE — GAMMEX® NON-LATEX SIZE 7.5, STERILE NEOPRENE POWDER-FREE SURGICAL GLOVE: Brand: GAMMEX

## (undated) DEVICE — GLOVE ORANGE PI 7   MSG9070

## (undated) DEVICE — 450 ML BOTTLE OF 0.05% CHLORHEXIDINE GLUCONATE IN 99.95% STERILE WATER FOR IRRIGATION, USP AND APPLICATOR.: Brand: IRRISEPT ANTIMICROBIAL WOUND LAVAGE

## (undated) DEVICE — CHLORAPREP 26ML ORANGE

## (undated) DEVICE — COVER ARMBRD W13XL28.5IN IMPERV BLU FOR OP RM

## (undated) DEVICE — Device

## (undated) DEVICE — BLANKET THER AD W24XL60IN FAB COVERING SUP SFT ULT THN LTWT

## (undated) DEVICE — GLOVE SURG SZ 65 THK91MIL LTX FREE SYN POLYISOPRENE

## (undated) DEVICE — BLADE CLIPPER GEN PURP NS